# Patient Record
Sex: MALE | Race: WHITE | URBAN - NONMETROPOLITAN AREA
[De-identification: names, ages, dates, MRNs, and addresses within clinical notes are randomized per-mention and may not be internally consistent; named-entity substitution may affect disease eponyms.]

---

## 2021-03-02 ENCOUNTER — TELEPHONE (OUTPATIENT)
Dept: CARDIOLOGY CLINIC | Age: 21
End: 2021-03-02

## 2021-03-24 ENCOUNTER — OFFICE VISIT (OUTPATIENT)
Dept: CARDIOLOGY CLINIC | Age: 21
End: 2021-03-24
Payer: COMMERCIAL

## 2021-03-24 VITALS
SYSTOLIC BLOOD PRESSURE: 117 MMHG | BODY MASS INDEX: 18.53 KG/M2 | HEIGHT: 76 IN | HEART RATE: 93 BPM | WEIGHT: 152.2 LBS | DIASTOLIC BLOOD PRESSURE: 71 MMHG

## 2021-03-24 DIAGNOSIS — R00.0 TACHYCARDIA: Primary | ICD-10-CM

## 2021-03-24 PROBLEM — K21.9 GERD (GASTROESOPHAGEAL REFLUX DISEASE): Status: ACTIVE | Noted: 2021-03-24

## 2021-03-24 PROBLEM — F41.9 ANXIETY: Status: ACTIVE | Noted: 2021-03-24

## 2021-03-24 PROBLEM — F98.8 ADD (ATTENTION DEFICIT DISORDER): Status: ACTIVE | Noted: 2021-03-24

## 2021-03-24 PROCEDURE — 99204 OFFICE O/P NEW MOD 45 MIN: CPT | Performed by: INTERNAL MEDICINE

## 2021-03-24 PROCEDURE — 93000 ELECTROCARDIOGRAM COMPLETE: CPT | Performed by: INTERNAL MEDICINE

## 2021-03-24 PROCEDURE — G8420 CALC BMI NORM PARAMETERS: HCPCS | Performed by: INTERNAL MEDICINE

## 2021-03-24 PROCEDURE — 1036F TOBACCO NON-USER: CPT | Performed by: INTERNAL MEDICINE

## 2021-03-24 PROCEDURE — G8484 FLU IMMUNIZE NO ADMIN: HCPCS | Performed by: INTERNAL MEDICINE

## 2021-03-24 PROCEDURE — G8427 DOCREV CUR MEDS BY ELIG CLIN: HCPCS | Performed by: INTERNAL MEDICINE

## 2021-03-24 NOTE — PROGRESS NOTES
Yoni Pierre referred  Seizure like activity- become unresponsive  Neurology consult-  Dr. Salvador Soares  holter monitor-  48 hr-  [address] [facility] - admit

## 2021-03-24 NOTE — PROGRESS NOTES
[address] [facility]  Dept: [phone]         CARDIAC ELECTROPHYSIOLOGY: CONSULTATION NOTE  PATIENT DEMOGRAPHICS:  Date:   3/24/2021  Patient name: Lauren Power  YOB: 2000  Sex: male   MRN:   [id number]    PRIMARY CARE PHYSICIAN:   Aurora Cochran DO    REFERRING PROVIDER:  Jose Klein MD    REASON FOR CONSULTATION:  Sinus tachycardia. Dear Dr. Roger Tay: I had an opportunity to see Mr. Coffey today in electrophysiology clinic. As you know, he is a 24years old male with history of anxiety disorder and attention deficit disorder on treatment who has been experiencing frequent episodes of tingling in the arms associated with palpitation which she describes as \"spells\". He was admitted to [facility] on 2/23/2021 with 1 of those spells. He underwent evaluation by neurologist and his EEG was normal.  During the hospitalization the patient was noted to have sinus tachycardia, heart rate 170-180 beats per minute. The heart rate gradually settled down to his base rate of 80 to 90 bpm.  He was evaluated by Dr. Roger Tay. Echocardiogram was normal.  48-hour Holter monitor showed isolated premature atrial and isolated premature ventricular complexes and he was started on metoprolol and referred here for possible evaluation of inappropriate sinus tachycardia. The patient otherwise reports to be feeling good. He denies chest pain, shortness of breath, syncope. He is otherwise fit and active and denies any exercise later cardiopulmonary symptoms. REVIEW OF SYSTEMS:    Constitutional: Negative for chills and fever  HENT: Negative for congestion, sinus pressure, sneezing and sore throat. Eyes: Negative for pain, discharge, redness and itching.    Respiratory: Negative for apnea, cough  Gastrointestinal: Negative for blood in stool, constipation, diarrhea Endocrine: Negative for cold intolerance, heat intolerance, polydipsia. Genitourinary: Negative for dysuria, enuresis, flank pain and hematuria. Musculoskeletal: Negative for arthralgias, joint swelling and neck pain. Neurological: Negative for numbness and headaches. Psychiatric/Behavioral: Negative for agitation, confusion, decreased concentration and dysphoric mood. PAST MEDICAL HISTORY:  Past Medical History:   Diagnosis Date    ADHD     Depression        PSH:  History reviewed. No pertinent surgical history. FAMILY HISTORY:  He is adopted. Does not know his parents. Family History   Adopted: Yes        SOCIAL HISTORY:  He works at [employer]. Denies smoking, alcohol and drug use. He is single.   Social History     Socioeconomic History    Marital status: Single     Spouse name: None    Number of children: None    Years of education: None    Highest education level: None   Occupational History    None   Social Needs    Financial resource strain: None    Food insecurity     Worry: None     Inability: None    Transportation needs     Medical: None     Non-medical: None   Tobacco Use    Smoking status: Never Smoker    Smokeless tobacco: Never Used   Substance and Sexual Activity    Alcohol use: Never     Frequency: Never     Binge frequency: Never    Drug use: Never    Sexual activity: None   Lifestyle    Physical activity     Days per week: None     Minutes per session: None    Stress: None   Relationships    Social connections     Talks on phone: None     Gets together: None     Attends Worship service: None     Active member of club or organization: None     Attends meetings of clubs or organizations: None     Relationship status: None    Intimate partner violence     Fear of current or ex partner: None     Emotionally abused: None     Physically abused: None     Forced sexual activity: None   Other Topics Concern    None   Social History Narrative    None ALLERGY HISTORY:  No Known Allergies     MEDICATIONS:  Current Outpatient Medications   Medication Sig Dispense Refill    acetaminophen (TYLENOL) 325 MG tablet Take 650 mg by mouth every 6 hours as needed for Pain      ARIPiprazole (ABILIFY) 5 MG tablet Take 5 mg by mouth daily      LORAZEPAM IJ Inject as directed 3 milligrams Intravenous push once, PRN for seizures lasting more than 5 minutes      metoprolol succinate (TOPROL XL) 25 MG extended release tablet Take 25 mg by mouth daily      atomoxetine (STRATTERA) 40 MG capsule Take 40 mg by mouth daily      pantoprazole (PROTONIX) 40 MG tablet Take 40 mg by mouth daily      guanFACINE HCl 2 MG TABS Take by mouth       No current facility-administered medications for this visit. PHYSICAL EXAM:  Vitals:    03/24/21 1451   BP: 117/71   Pulse: 93     Body mass index is 18.53 kg/m². GENERAL: Alert and oriented. No distress. EYES: No conjunctival pallor or scleral icterus. ENT: Moist oral and nasal mucosae. No central cyanosis. VESSELS: No jugular venous distension. Normal carotid pulse. No carotid bruits. HEART: Normal S1/S2. No murmur, rub or gallop. LUNGS: Clear to auscultation. No wheezes or crackles. ABDOMEN: Soft and non-tender. No organomegaly or shifting dullness. EXTREMITIES: No lower extremity edema, clubbing or cyanosis. 2+ peripheral pulses bilaterally. NEUROLOGICAL: Higher functions and cranial nerves are normal. Normal power both upper and lower extremities. LABORATORY DATA AND DIAGNOSTIC DATA:  I have personally reviewed and interpreted the results of the following diagnostic testing    Laboratory data from [facility] dated 2/23/2021 reviewed: Normal including TSH. Echocardiogram from [facility]: Normal study. LVEF 55 to 60%. ECG sinus rhythm. No apparent role, curing QTc interval.  Holter monitor: Sinus rhythm. Isolated PACs and PVCs. IMPRESSION:  1. Episodes of palpitation and tingling in the arms with feeling unwell. 2.  Sinus tachycardia during her recent hospitalization, resolved spontaneously. Currently on metoprolol. 3.  Isolated PACs and PVCs. 4.  Anxiety and attention deficit disorder, on treatment. ASSESSMENT AND RECOMMENDATIONS:  The patient's physical examination is normal.  His thyroid functions, routine laboratory work-up, electrocardiogram and echocardiogram are unrevealing. The patient is known to have anxiety disorder. He was evaluated by a neurologist and his EEG was unremarkable. We will request a 30-day event monitor to further evaluate his sinus tachycardia. We will continue him on the metoprolol at this time. We will also review the tracing from [facility].  Further recommendation to follow. Plan discussed with the patient and his mother (via Miguel Isabel) who is a nurse in [place]. Patient reassured and his questions answered. Thank you Dr. Salazar Cornell for allowing me to participate in the care of your patient. Please call me if you have any questions. **This report has been created using voice recognition software. It may contain minor errors which are inherent in voice recognition technology. **       Electronically signed by Raffi Fernandes MD, [facility], Emmanuelle Soria on 3/24/2021 at 4:33 PM

## 2021-05-11 ENCOUNTER — TELEPHONE (OUTPATIENT)
Dept: CARDIOLOGY CLINIC | Age: 21
End: 2021-05-11

## 2021-05-11 NOTE — TELEPHONE ENCOUNTER
Patient's Enma Handley parent-  Andre Jackson called in to the office and cancelled the follow up apt on 05.12.2021. States that the patient has been noncompliant with wearing the monitor. Andre Jackson states that the metoprolol has been working for the patient.      Apt cancelled at her request.